# Patient Record
Sex: MALE | Race: WHITE
[De-identification: names, ages, dates, MRNs, and addresses within clinical notes are randomized per-mention and may not be internally consistent; named-entity substitution may affect disease eponyms.]

---

## 2018-02-23 ENCOUNTER — HOSPITAL ENCOUNTER (OUTPATIENT)
Dept: HOSPITAL 80 - FED | Age: 78
Setting detail: OBSERVATION
Discharge: HOME | End: 2018-02-23
Attending: INTERNAL MEDICINE | Admitting: INTERNAL MEDICINE
Payer: COMMERCIAL

## 2018-02-23 VITALS — RESPIRATION RATE: 18 BRPM | TEMPERATURE: 97.5 F | HEART RATE: 75 BPM | OXYGEN SATURATION: 94 %

## 2018-02-23 VITALS — DIASTOLIC BLOOD PRESSURE: 87 MMHG | SYSTOLIC BLOOD PRESSURE: 135 MMHG

## 2018-02-23 DIAGNOSIS — I25.2: ICD-10-CM

## 2018-02-23 DIAGNOSIS — Z95.5: ICD-10-CM

## 2018-02-23 DIAGNOSIS — I77.810: ICD-10-CM

## 2018-02-23 DIAGNOSIS — K21.9: ICD-10-CM

## 2018-02-23 DIAGNOSIS — I25.10: ICD-10-CM

## 2018-02-23 DIAGNOSIS — Z87.891: ICD-10-CM

## 2018-02-23 DIAGNOSIS — R07.9: Primary | ICD-10-CM

## 2018-02-23 LAB — PLATELET # BLD: 138 10^3/UL (ref 150–400)

## 2018-02-23 PROCEDURE — 99284 EMERGENCY DEPT VISIT MOD MDM: CPT

## 2018-02-23 PROCEDURE — 93017 CV STRESS TEST TRACING ONLY: CPT

## 2018-02-23 PROCEDURE — 78452 HT MUSCLE IMAGE SPECT MULT: CPT

## 2018-02-23 PROCEDURE — 71046 X-RAY EXAM CHEST 2 VIEWS: CPT

## 2018-02-23 PROCEDURE — A9500 TC99M SESTAMIBI: HCPCS

## 2018-02-23 PROCEDURE — 93005 ELECTROCARDIOGRAM TRACING: CPT

## 2018-02-23 PROCEDURE — G0378 HOSPITAL OBSERVATION PER HR: HCPCS

## 2018-02-23 NOTE — PDCARST
CAR Stress Test Results


Type of Stress Test: nuclear TM stress


Indication: cp/ PMH CAD


Description of Procedure: After informed consent was obtained, pt was exercised 

according to Flavio Protocol. Monitoring was performed with standard stress test 

lead electrode placement. Vital signs were monitored according to protocol 

throughout the procedure.  STRESS EKG AND HEMODYNAMIC DATA.  Exercise time: 5 

min.  This is equivalent to:  6.1  METS.  Resting heart rate:   81 bpm.  

Resting blood pressure: 126/78 mmHg.  Resting O2 saturation:91 %.  Peak heart 

rate: 142 bpm. This is 100% of age predicted maximum heart rate response.  Peak 

blood pressure: 174/90  mmHg.  Exercise O2:  94%.  Arrhythmias: Rest- 

occasional PVCs; exercise- occasional PVCs; recovery- occasional PVCs and 

frequent PACs.  Reason for termination: The test was stopped due to maximal 

effort.  Symptoms: The patient experienced no typical symptoms of angina during 

stress or recovery.  STRESS TEST ANALYSIS.  Baseline ECG: SR with RBBB and 1 

PVC.  Stress ECG: SR, RBBB, 1 mm upsloping STD.  exercise induced ischemic ECG 

changes: indeterminate due to RBBB.  Rhythm: Occasional PVCs and frequent PACs 

noted during exercise and recovery.  Blood pressure: Normal blood pressure 

response to exercise.  Exercise tolerance: The patient has normal exercise 

tolerance adjusted for age and gender.  Symptoms: No exercise induced symptoms.


Impression: IMPRESSIONS:  Stress ECG is indeterminate for ischemia given RBBB 

precluding accurate interpretation of V1 and V2.  Adequate exercise tolerance, 

no cp during exertion, and normal BP and oximetry.


Conclusion: Await nuclear images.

## 2018-02-23 NOTE — ASMTCASEMG
Living Arrangements

 

What is your living           Answers:  Alone                                 

arrangement? Who do you                                                       

live with?                                                                    

Type Of Residence

 

What kind of residence do     Answers:  House                                 

you live in?                                                                  

Discharge Plan Comments

 

Coordination Status           

Comments                      

Notes:

Pt is a 79 y/o man admitted for chest pain. Pt will have a stress test at some point. Pt will most 

likely d/c independent when medically stable. No therapies ordered at this time. CM available for 

changes.







Plan: Independent

 

Date Signed:  02/23/2018 10:24 AM

Electronically Signed By:WILEY Sheets

## 2018-02-23 NOTE — EDPHY
H & P


Stated Complaint: cp


Time Seen by Provider: 02/23/18 01:04


HPI/ROS: 





HPI


The patient presents with chest pain which began at 5:00 p.m. Tonight while he 

was at rest.  He describes it as a stinging left-sided chest pain that was 

intermittent.  It feels like his prior MI that he had in July though is more 

subtle.  The pain is not associated with any shortness of breath, nausea, 

vomiting, dizziness, diaphoresis.  He took 2 doses of aspirin and called an 

advice line and was instructed to come into the emergency department.  He has a 

history of an MI in July of 2017. He does walk daily about 45 min and does not 

experience chest pain .





REVIEW OF SYSTEMS


Constitutional:  No fever, no chills.


Eyes:  No discharge.


ENT:  No sore throat.


Cardiovascular:  Positive for chest pain, no palpitations.


Respiratory:  No cough, no shortness of breath.


Gastrointestinal:  No abdominal pain, no vomiting.


Genitourinary:  No hematuria.


Musculoskeletal:  No back pain.


Skin:  No rashes.


Neurological:  No headache.





PMHx:  Known CAD





Soc Hx:  Housed








PHYSICAL


General Appearance: Alert, no distress


Eyes: Pupils equal and round no pallor or injection


ENT, Mouth: Mucous membranes moist


Respiratory: There are no retractions, lungs are clear to auscultation


Cardiovascular:  Regular rate and rhythm 


Gastrointestinal:  Abdomen is soft and non-tender, no masses, bowel sounds 

normal 


Neurological:  A&O, moves all extremities


Skin:  Warm and dry, no rashes


Musculoskeletal: Neck is supple non tender 


Extremities:  symmetrical, full range of motion 


Psychiatric:  Patient is oriented X 3, there is no agitation 





Source: Patient


Exam Limitations: No limitations





- Personal History


Current Tetanus Diphtheria and Acellular Pertussis (TDAP): Yes





- Medical/Surgical History


Hx Asthma: No


Hx Chronic Respiratory Disease: No


Hx Diabetes: No


Hx Cardiac Disease: No


Hx Renal Disease: No


Hx Cirrhosis: No


Hx Alcoholism: No


Hx HIV/AIDS: No


Hx Splenectomy or Spleen Trauma: No


Other PMH: appy ,mi, cardiac stents.  tonsils.  amp thumb as child





- Social History


Smoking Status: Former smoker


Constitutional: 


 Initial Vital Signs











Temperature (C)  36.7 C   02/23/18 00:30


 


Heart Rate  72   02/23/18 00:30


 


Respiratory Rate  16   02/23/18 00:30


 


Blood Pressure  135/82 H  02/23/18 00:30


 


O2 Sat (%)  94   02/23/18 00:30








 











O2 Delivery Mode               Room Air














Allergies/Adverse Reactions: 


 





iodine Allergy (Verified 04/26/16 01:17)


 








Home Medications: 














 Medication  Instructions  Recorded


 


Acyclovir [Zovirax 400 mg (*)] 400 mg PO TID 02/23/18


 


Bisoprolol Fumarate [Zebeta (*)] 2.5 mg PO DAILY 02/23/18


 


Efinaconazole [Jublia] 4 ml TP 02/23/18


 


Finasteride [Proscar 5 MG (*)] 5 mg PO DAILY 02/23/18


 


Simvastatin [Zocor] 40 mg PO 02/23/18


 


Ticagrelor [Brilinta] 90 mg PO BID 02/23/18














Medical Decision Making





- Diagnostics


EKG Interpretation: 





EKG:  Complete interpretation has been separately recorded in the [facility] 

archive.  Summary impression:  Right bundle-branch block with left posterior 

fascicular block





Imaging Results: 





Chest x-ray two view shows no cardiomegaly, no infiltrate, interpreted by me, 

radiology interpretation is pending.


Differential Diagnosis: 





This is a 78-year-old male with known CAD status post stents placed for acute 

MI in July of 2017 who presents with chest pain lasting for several hours, 

occurring at rest, now resolved.





Differential diagnosis includes ACS, costochondritis, GERD.





In the emergency department, EKG, chest x-ray, basic labs were obtained.  These 

were all normal.  Patient does have right bundle-branch block.  I do not have 

an old EKG for comparison.  Given his history of CAD with pain which feels 

similar to his prior MI, I feel he should be admitted to the hospitalist.  I 

have discussed the case with Dr. Brewster who will admit the patient.  The 

patient is in agreement with the plan.





- Data Points


Laboratory Results: 


 Laboratory Results





 02/23/18 00:43 





 02/23/18 00:43 





 











  02/23/18 02/23/18





  00:43 00:43


 


WBC    6.05 10^3/uL 10^3/uL





    (3.80-9.50) 


 


RBC    4.60 10^6/uL 10^6/uL





    (4.40-6.38) 


 


Hgb    14.3 g/dL g/dL





    (13.7-17.5) 


 


Hct    40.7 % %





    (40.0-51.0) 


 


MCV    88.5 fL fL





    (81.5-99.8) 


 


MCH    31.1 pg pg





    (27.9-34.1) 


 


MCHC    35.1 g/dL g/dL





    (32.4-36.7) 


 


RDW    14.4 % %





    (11.5-15.2) 


 


Plt Count    138 10^3/uL L 10^3/uL





    (150-400) 


 


MPV    9.0 fL fL





    (8.7-11.7) 


 


Neut % (Auto)    62.8 % %





    (39.3-74.2) 


 


Lymph % (Auto)    22.5 % %





    (15.0-45.0) 


 


Mono % (Auto)    7.4 % %





    (4.5-13.0) 


 


Eos % (Auto)    6.3 % %





    (0.6-7.6) 


 


Baso % (Auto)    0.7 % %





    (0.3-1.7) 


 


Nucleat RBC Rel Count    0.0 % %





    (0.0-0.2) 


 


Absolute Neuts (auto)    3.80 10^3/uL 10^3/uL





    (1.70-6.50) 


 


Absolute Lymphs (auto)    1.36 10^3/uL 10^3/uL





    (1.00-3.00) 


 


Absolute Monos (auto)    0.45 10^3/uL 10^3/uL





    (0.30-0.80) 


 


Absolute Eos (auto)    0.38 10^3/uL 10^3/uL





    (0.03-0.40) 


 


Absolute Basos (auto)    0.04 10^3/uL 10^3/uL





    (0.02-0.10) 


 


Absolute Nucleated RBC    0.00 10^3/uL 10^3/uL





    (0-0.01) 


 


Immature Gran %    0.3 % %





    (0.0-1.1) 


 


Immature Gran #    0.02 10^3/uL 10^3/uL





    (0.00-0.10) 


 


Sodium  138 mEq/L mEq/L  





   (135-145)  


 


Potassium  4.2 mEq/L mEq/L  





   (3.5-5.2)  


 


Chloride  101 mEq/L mEq/L  





   ()  


 


Carbon Dioxide  27 mEq/l mEq/l  





   (22-31)  


 


Anion Gap  10 mEq/L mEq/L  





   (8-16)  


 


BUN  18 mg/dL mg/dL  





   (7-23)  


 


Creatinine  1.0 mg/dL mg/dL  





   (0.7-1.3)  


 


Estimated GFR  > 60   





   


 


Glucose  100 mg/dL mg/dL  





   ()  


 


Calcium  9.3 mg/dL mg/dL  





   (8.5-10.4)  


 


Troponin I  < 0.012 ng/mL ng/mL  





   (0.000-0.034)  














Departure





- Departure


Disposition: Home, Routine, Self-Care


Clinical Impression: 


Chest pain


Qualifiers:


 Chest pain type: unspecified Qualified Code(s): R07.9 - Chest pain, unspecified





Condition: Good

## 2018-02-23 NOTE — CPEKG
Heart Rate: 73

RR Interval: 822

P-R Interval: 196

QRSD Interval: 136

QT Interval: 436

QTC Interval: 481

P Axis: 49

QRS Axis: 102

T Wave Axis: 8

EKG Severity - ABNORMAL ECG -

EKG Impression: SINUS RHYTHM

EKG Impression: RBBB AND LPFB

Electronically Signed By: Cliff Cha 23-Feb-2018 09:31:12

## 2018-02-23 NOTE — PDGENHP
History and Physical





- Chief Complaint


Chest pain





- History of Present Illness


79 yo M w/ hx of CAD presents after chest pain. Patient became upset today and 

then began to notice sharp, stinging left sided chest pain. This persisted for 

about 7 hours without significant change. He did walk up some stairs during 

this episode of pain and it did not seem to affect the pain. He denies any 

associated symptoms. He called the [facility] RN line and was advised to come to the 

ED for evaluation. He is currently comfortable and chest pain free.





History Information





- Allergies/Home Medication List


Allergies/Adverse Reactions: 








iodine Allergy (Verified 04/26/16 01:17)


 





Home Medications: 








Aspirin  02/23/18 [Last Taken Unknown]


Bisoprolol Fumarate  02/23/18 [Last Taken Unknown]


Brilinta  02/23/18 [Last Taken Unknown]


Esomeprazole Sodium  02/23/18 [Last Taken Unknown]


SIMVASTATIN  02/23/18 [Last Taken Unknown]


Tamsulosin HCl  02/23/18 [Last Taken Unknown]





I have personally reviewed and updated: family history, medical history





- Past Medical History


coronary artery disease





- Family History


Positive for: male first degree with history of premature CAD





- Social History


Smoking Status: Former smoker





Review of Systems


Review of Systems: 





ROS: 10pt was reviewed & negative except for what was stated in HPI & below





Physical Exam


Physical Exam: 

















Temp Pulse Resp BP Pulse Ox


 


 36.7 C   72   16   135/82 H  94 


 


 02/23/18 00:30  02/23/18 00:30  02/23/18 00:30  02/23/18 00:30  02/23/18 00:30











Constitutional: no apparent distress, not in pain


Eyes: PERRL, EOMI


Ears, Nose, Mouth, Throat: moist mucous membranes, no oral mucosal ulcers


Cardiovascular: regular rate and rhythym, systolic murmur


Respiratory: no respiratory distress, no rales or rhonchi


Skin: warm, normal color


Musculoskeletal: full muscle strength, no muscle tenderness


Neurologic: AAOx3, CN II-XII Intact


Psychiatric: interacting appropriately, not anxious





Lab Data & Imaging Review





 02/23/18 00:43





 02/23/18 00:43














WBC  6.05 10^3/uL (3.80-9.50)   02/23/18  00:43    


 


RBC  4.60 10^6/uL (4.40-6.38)   02/23/18  00:43    


 


Hgb  14.3 g/dL (13.7-17.5)   02/23/18  00:43    


 


Hct  40.7 % (40.0-51.0)   02/23/18  00:43    


 


MCV  88.5 fL (81.5-99.8)   02/23/18  00:43    


 


MCH  31.1 pg (27.9-34.1)   02/23/18  00:43    


 


MCHC  35.1 g/dL (32.4-36.7)   02/23/18  00:43    


 


RDW  14.4 % (11.5-15.2)   02/23/18  00:43    


 


Plt Count  138 10^3/uL (150-400)  L  02/23/18  00:43    


 


MPV  9.0 fL (8.7-11.7)   02/23/18  00:43    


 


Neut % (Auto)  62.8 % (39.3-74.2)   02/23/18  00:43    


 


Lymph % (Auto)  22.5 % (15.0-45.0)   02/23/18  00:43    


 


Mono % (Auto)  7.4 % (4.5-13.0)   02/23/18  00:43    


 


Eos % (Auto)  6.3 % (0.6-7.6)   02/23/18  00:43    


 


Baso % (Auto)  0.7 % (0.3-1.7)   02/23/18  00:43    


 


Nucleat RBC Rel Count  0.0 % (0.0-0.2)   02/23/18  00:43    


 


Absolute Neuts (auto)  3.80 10^3/uL (1.70-6.50)   02/23/18  00:43    


 


Absolute Lymphs (auto)  1.36 10^3/uL (1.00-3.00)   02/23/18  00:43    


 


Absolute Monos (auto)  0.45 10^3/uL (0.30-0.80)   02/23/18  00:43    


 


Absolute Eos (auto)  0.38 10^3/uL (0.03-0.40)   02/23/18  00:43    


 


Absolute Basos (auto)  0.04 10^3/uL (0.02-0.10)   02/23/18  00:43    


 


Absolute Nucleated RBC  0.00 10^3/uL (0-0.01)   02/23/18  00:43    


 


Immature Gran %  0.3 % (0.0-1.1)   02/23/18  00:43    


 


Immature Gran #  0.02 10^3/uL (0.00-0.10)   02/23/18  00:43    


 


Sodium  138 mEq/L (135-145)   02/23/18  00:43    


 


Potassium  4.2 mEq/L (3.5-5.2)   02/23/18  00:43    


 


Chloride  101 mEq/L ()   02/23/18  00:43    


 


Carbon Dioxide  27 mEq/l (22-31)   02/23/18  00:43    


 


Anion Gap  10 mEq/L (8-16)   02/23/18  00:43    


 


BUN  18 mg/dL (7-23)   02/23/18  00:43    


 


Creatinine  1.0 mg/dL (0.7-1.3)   02/23/18  00:43    


 


Estimated GFR  > 60   02/23/18  00:43    


 


Glucose  100 mg/dL ()   02/23/18  00:43    


 


Calcium  9.3 mg/dL (8.5-10.4)   02/23/18  00:43    


 


Troponin I  < 0.012 ng/mL (0.000-0.034)   02/23/18  00:43    








Visualized and Interpreted EKG results: Yes


EKG Interpretation: Positive for: normal sinsus rhythm, right bundle branch 

block





Assessment & Plan


Assessment: 








79 yo M w/ hx of CAD presents with chest pain.








Plan: 


1. Chest pain - Present for about 7 hours after episode of emotional stress. 

Not classic for anginal chest pain noting no variation with activity but does 

have history of stent placement last July. HEART score of 4 so will be admitted 

for inpatient risk stratification.


- Admit to PCU for observation


- Monitor on telemetry, trend cardiac enzymes


- Nuclear stress test ordered


2. Hx of CAD - Had 2 stents placed in July of 2017 while in [place]. He has 

been compliant with BB, ASA, statin, and ticagrelor.


- Continue home meds


- Acute management as above


3. GERD - On PPI





Diet - NPO pending stress test


Ppx - LMWH


Code - Full


Dispo - Admit to PCU under observation status

## 2018-02-24 NOTE — GDS
[f rep st]



                                                             DISCHARGE SUMMARY





DISCHARGE DIAGNOSES:  

1.  Chest pain with negative stress testing.

2.  Coronary artery disease, status post recent stenting.



HISTORY:  The patient is a 78-year-old male, who developed atypical left-sided chest pain.  He had 2 
stents placed while in [place] in July 2017.  He is on a good medical regimen including aspirin, Bril
inta, statin, and a beta blocker.  He was admitted to observation with serial troponins and EKGs that
 were unremarkable.  Stress test today showed an ejection fraction of 45% and a fixed inferolateral w
all defect consistent with previous infarct and some subtle inferior wall hypokinesis.  It is suspect
ed that this is due to an infarct he sustained in [place].  There was no reversible defect.  He follo
ws with Dr. Wagner at [facility] and will follow up with them regarding further m
anagement, but acute cardiac catheterization was not felt to be needed at this time.



DISCHARGE MEDICATIONS:  Please see computerized record for full detailed list.  There were no new med
ications given at time of hospital discharge.



ADDITIONAL DISCHARGE INSTRUCTIONS:  Follow up with Dr. Villalta and Dr. Wagner at [facility]
 for further care.  



Greater than 30 minutes' time was spent arranging this discharge.  Patient was seen and examined by m
e at the time of discharge.





Job #:  [id number]

## 2019-04-18 ENCOUNTER — HOSPITAL ENCOUNTER (EMERGENCY)
Dept: HOSPITAL 80 - FED | Age: 79
LOS: 1 days | Discharge: HOME | End: 2019-04-19
Payer: COMMERCIAL

## 2019-04-18 DIAGNOSIS — E86.9: ICD-10-CM

## 2019-04-18 DIAGNOSIS — T78.40XA: Primary | ICD-10-CM

## 2019-04-18 DIAGNOSIS — R07.89: ICD-10-CM

## 2019-04-18 DIAGNOSIS — Z87.891: ICD-10-CM

## 2019-04-18 PROCEDURE — 93005 ELECTROCARDIOGRAM TRACING: CPT

## 2019-04-18 PROCEDURE — 99284 EMERGENCY DEPT VISIT MOD MDM: CPT

## 2019-04-18 PROCEDURE — 96365 THER/PROPH/DIAG IV INF INIT: CPT

## 2019-04-18 PROCEDURE — 96375 TX/PRO/DX INJ NEW DRUG ADDON: CPT

## 2019-04-18 PROCEDURE — 96361 HYDRATE IV INFUSION ADD-ON: CPT

## 2019-04-18 NOTE — EDPHY
General





- History


Smoking Status: Former smoker


Time Seen by Provider: 04/18/19 23:01


Narrative: 








CLINICAL IMPRESSION: 





Allergic reaction, chest tightness


 


ASSESSMENT/PLAN: 





Patient is an 79-year-old male with a significant history of coronary artery 

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 

with possible allergic reaction.  He is well-appearing and in no acute 

distress.  His lungs were clear to auscultation bilaterally, no evidence of 

hypoxia or respiratory distress.  The patient did have complaints of chest 

tightness and mild shortness of breath, an ECG was obtained which revealed a 

right bundle branch block with frequent PVCs.  Troponin was 0.04, negative.  No 

findings to suggest ACS; I suspect his brief chest tightness and shortness of 

breath was secondary to his allergic reaction.  There was no evidence of 

angioedema, stridor, respiratory distress or airway compromise.  Patient was 

given Benadryl, Pepcid and Solu-Medrol in the emergency department with 

improvement of his symptoms.  I suspect patient was exposed to shellfish at the 

party he was at.  There were no findings to suggest anaphylaxis, SJS/TENS, 

medication reaction or infectious process.  On repeat examination at 0011 he is 

well-appearing, denies any complaints.  Patient will continued to be monitored 

in the emergency department, Dr. Clements will resume care of this patient at 

this time.





DIFFERENTIAL DX: 





Differential diagnosis including but not limited to anaphylaxis, urticaria, TENS

/SJS, medication reaction, infectious process


_____________ 


ED COURSE: 





2316: Case discussed with Dr. Clements.  ECG revealed right bundle branch block 

with frequent PVCs consistent with trigeminy.  When reviewing his old ECG, 

underlying right bundle branch block is present, overall very similar appearing.





2335:  P OC troponin is negative at 0.04.





0011:  On repeat examination the patient is resting comfortably.  His facial 

flushing has resolved, his conjunctiva are now normal appearing.  He denies any 

physical complaints.  His heart rate is 70, oxygen saturation is 96% on room 

air.





0024:  All aspects of this patient's care was discussed with Dr. Clements, he 

will resume care of this patient at this time.


_________________ 


CHIEF COMPLAINT:  Possible allergic reaction


_________________ 


HPI: 





Patient is an 79-year-old male with a significant history of coronary artery 

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 

with possible allergic reaction.  Patient reports he was at a party earlier 

this evening, he was not paying attention to what he ate, during the party he 

started to experience intense itching of his hands and uncontrollable sneezing.

  He subsequently started to develop some tightness in his chest with 

associated shortness of breath.  He states this feels exactly like similar 

episodes he has had in the past when exposed to shellfish.  Symptoms started 

approximately 2.5 hr prior to arrival, he took a Claritin and feels that his 

symptoms are mildly improving.  He denies any oral swelling, facial swelling, 

difficulty swallowing or change in voice.  He also denies any nausea, vomiting, 

chest pain, persistent chest tightness, persistent shortness of breath or 

abdominal pain.  He has not noticed any rash.  Patient does have a history of 

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


_________________ 


PMH:  Seasonal allergies 


Pertinent Past Surgical History:  Denies 


Family History:  Not contributory 


Social History:  Denies 


_________________ 


REVIEW OF SYSTEMS: 


All other systems negative 


Constitutional: No fever. 


Eyes: No discharge, vision change 


ENT: No sore throat, congestion, ear pain. 


Cardiovascular:  Chest tightness, denies palpitations.  


Respiratory:  Shortness of breath, denies cough.


Gastrointestinal: No abdominal pain, no vomiting. 


Genitourinary: No hematuria, dysuria, flank pain, pelvic pain 


Musculoskeletal: No back pain, no myalgias. 


Skin:  Itching.  Denies rash.


Neurological: No headache, dizziness, weakness. 


_________________ 


PHYSICAL EXAM: 


General Appearance: Alert, well-appearing, no acute distress. 


HENT: 


Normocephalic, atraumatic.


Face is mildly flushed, faint macular rash on his forehead.


Bilateral external ears are normal.  Bilateral tympanic membranes are normal 

with pearly gray reflex.


Nares are clear, mucosa is pink.


Oropharynx is clear, uvula is midline.  There is no tonsillar enlargement or 

exudate.  There is no evidence of angioedema.


Phonation is normal without hot potato voice, there is no stridor. 


Eyes: PERRLA, EOMI. Conjunctiva injected. 


Neck: Supple, nontender. 


Respiratory: There are no retractions, lungs are clear to auscultation.  There 

is no wheeze or rhonchi. 


Cardiac: Irregular rhythm, no murmurs or gallops. 


Gastrointestinal: Abdomen is soft, nontender, bowel sounds normal, no masses/

hernia, no rigidity, guarding or focal peritoneal findings. 


Neurological:  Alert and oriented x 3, CN 2-12 grossly intact. 


Skin: 


Warm and dry.  See above, no other rash appreciated.


Musculoskeletal: Extremities are symmetrical, full range of motion, no 

tenderness, deformity, swelling, or erythema. 


Psychiatric: Patient is oriented X 3, there is no agitation. 


_________________ 


MEDICAL DECISION MAKING: 


Patient was seen independently. Secondary supervising physician at time of 

evaluation was Dr. Clements.


Summary: See Assessment and Plan.


Clinical lab tests:  Not applicable. 


Independent visualization of images, tracing, or specimens:  Not applicable. 


Decision to obtain medical records or history from someone other than the 

patient:  No 


Review / Summarize previous medical records:  Yes 


Discussed patient with another provider:  Yes, Dr. Clements


Patient Progress:  Stable, dispo pending at this time. 


 (Yee Vences)


Medical Decision Making: 





EKG interpretation by me on record in [employer] system.  Impression time of 

EKG 2:40 a.m. Sinus rhythm rate of 84, ventricular trigeminy, stable from the 

patient's previous EKGs no acute ischemia on this EKG.  Patient does not have 

chest pain.  This was repeated due to his cardiac history, and additionally his 

allergic reaction presentation.





Patient re-evaluated 2:56 a.m. Patient continues to do well there has been no 

progression of allergic reaction is been monitored here for over 4 hr and has 

done very well.  Denies any chest pain or shortness of breath.  No progression 

of allergic reaction he would like to go home.





We discussed return precautions he understands return emergency room if 

develops worsening symptoms this includes chest pain, shortness of breath, fever

, vomiting, not doing well





Patient had a repeat troponin 0.04 same as his previous troponin there has been 

no increased.  EKG has been stable.








Patient eager for discharge he explains to me that he had allergic reaction 

tonight with Hand itching, runny nose, some shortness of breath.  No chest pain 

no chest pressure.  He feels much better now.  He would like to go home.  I do 

feel that it is reasonable to discharge him.  He has had no progression of 

allergic reaction we discussed return precautions.  He is comfortable this plan


 (Julius Clements)





- Objective


Vital Signs: 


 Initial Vital Signs











Temperature (C)  36.4 C   04/18/19 22:56


 


Heart Rate  84   04/18/19 22:56


 


Respiratory Rate  20   04/18/19 22:56


 


Blood Pressure  116/92 H  04/18/19 22:56


 


O2 Sat (%)  93   04/18/19 22:56








 











O2 Delivery Mode               Room Air














Allergies/Adverse Reactions: 


 





iodine Allergy (Verified 04/18/19 22:56)


 Rash


shellfish derived Allergy (Verified 04/18/19 22:56)


 Unknown








Home Medications: 














 Medication  Instructions  Recorded


 


Aspirin [Aspirin 81mg (*)] 81 mg PO DAILY 02/23/18


 


Bisoprolol Fumarate [Zebeta (*)] 2.5 mg PO DAILY 02/23/18


 


Finasteride [Proscar 5 MG (*)] 5 mg PO DAILY 02/23/18


 


Magnesium Amino Acid Chelate 250 mg PO HS 02/23/18





[Magnesium]  


 


Omeprazole 20 mg PO DAILY 02/23/18


 


Simvastatin [Zocor] 40 mg PO HS 02/23/18


 


EPINEPHrine [Epipen 0.3 MG] 0.3 mg IM ONCE #2 syr 04/19/19


 


Famotidine [Pepcid 20 MG (*)] 20 mg PO BID #6 tab 04/19/19


 


diphenhydrAMINE [Benadryl 25 MG 25 mg PO BID #6 tab 04/19/19





(*)]  


 


methylPREDNISolone [Medrol Dose 1 each PO AD #1 ea 04/19/19





Mike]  











Laboratory Results: 


 











  04/18/19





  23:20


 


POC Troponin I  0.04 ng/mL ng/mL





   (0.00-0.08) 











Medications Given: 


 








Discontinued Medications





Diphenhydramine HCl (Benadryl Injection)  50 mg IVP EDNOW ONE


   Stop: 04/18/19 23:07


   Last Admin: 04/18/19 23:14 Dose:  50 mg


Sodium Chloride (Ns)  1,000 mls @ 0 mls/hr IV ONCE ONE; Wide Open


   PRN Reason: Protocol


   Stop: 04/18/19 23:07


   Last Admin: 04/18/19 23:20 Dose:  1,000 mls


Famotidine/Sodium Chloride (Pepcid 20 Mg (Premix))  50 mls @ 200 mls/hr IV 

EDNOW ONE


   Stop: 04/18/19 23:20


   Last Admin: 04/18/19 23:15 Dose:  50 mls


Methylprednisolone Sodium Succinate (Solu-Medrol)  125 mg IVP EDNOW ONE


   Stop: 04/18/19 23:07


   Last Admin: 04/18/19 23:14 Dose:  125 mg





Point of Care Test Results: 


 Chemistry











  04/18/19





  23:20


 


POC Troponin I  0.04 ng/mL ng/mL





   (0.00-0.08) 














Departure





- Departure


Disposition: Home, Routine, Self-Care


Clinical Impression: 


Allergic reaction


Qualifiers:


 Encounter type: initial encounter Qualified Code(s): T78.40XA - Allergy, 

unspecified, initial encounter





Condition: Good


Instructions:  Allergies (ED)


Additional Instructions: 


DISCHARGE INSTRUCTIONS FROM YOUR PROVIDER


Thank you for visiting our emergency department today. Please keep in mind that 

discharge from the emergency department does not mean that there is nothing 

wrong - it simply means that we have not identified an emergency condition that 

requires further evaluation or treatment in the hospital. You should always 

plan to follow up with primary care for re-evaluation of your condition in the 

next 1-2 days. 





Avoid any known allergens and exposures.





Take Zyrtec 10 mg nightly. This is over the counter.





Pepcid 40 mg daily. This is over the counter.





Benadryl 25 mg every 4-6 hours as needed for breakthrough itching, hives and/or 

swelling.  This may make you sleepy, please do not drive or operate machinery.  

I would also recommend that you take Zyrtec daily, please follow instructions.





Medrol Dosepack (STEROID) as prescribed. 


More than 50% of people will notice resolution of allergy symptoms after 

starting this medication. BUT, over 50% of people will notice recurrence of 

allergy symptoms called rebound when the medication is stopped. 


If a steroid is taken close to allergy testing, the results will be affected.





Schedule a follow-up visit with your primary care provider for re-evaluation 

next week.





Watch closely for any signs of throat tightness and/or difficulty breathing, 

worsening rash , oral swelling , difficulty swallowing , facial swelling, 

faintness or for any other concerning symptom.  These symptoms can suggest a 

life threatening allergic reaction and require immediate attention of a medical 

professional. 


 


People present with illnesses and injuries in different ways, and it is always 

possible that we have missed something. 





Again, thank you for choosing our emergency department. We hope that you feel 

better.


Referrals: 


Deon Koenig MD [Medical Doctor] - As per Instructions


Prescriptions: 


diphenhydrAMINE [Benadryl 25 MG (*)] 25 mg PO BID #6 tab


EPINEPHrine [Epipen 0.3 MG] 0.3 mg IM ONCE #2 syr


Famotidine [Pepcid 20 MG (*)] 20 mg PO BID #6 tab


methylPREDNISolone [Medrol Dose Mike] 1 each PO AD #1 ea

## 2019-04-19 VITALS — DIASTOLIC BLOOD PRESSURE: 48 MMHG | SYSTOLIC BLOOD PRESSURE: 102 MMHG

## 2019-04-19 NOTE — CPEKG
Test Reason : OPEN

Blood Pressure : ***/*** mmHG

Vent. Rate : 084 BPM     Atrial Rate : 085 BPM

   P-R Int : 182 ms          QRS Dur : 141 ms

    QT Int : 408 ms       P-R-T Axes : 045 110 002 degrees

   QTc Int : 483 ms

 

Sinus rhythm

Ventricular trigeminy

RBBB and LPFB

Inferior infarct, old

 

Confirmed by Julius Clements ([id number]) on 4/19/2019 7:45:54 AM

 

Referred By: Julius Clements           Confirmed By:Julius Clements

## 2019-04-19 NOTE — CPEKG
Test Reason : OPEN

Blood Pressure : ***/*** mmHG

Vent. Rate : 089 BPM     Atrial Rate : 088 BPM

   P-R Int : 176 ms          QRS Dur : 143 ms

    QT Int : 408 ms       P-R-T Axes : 041 245 010 degrees

   QTc Int : 497 ms

 

Sinus rhythm

Ventricular trigeminy

Right bundle branch block

 

Confirmed by Julius Clements ([id number]) on 4/19/2019 7:45:55 AM

 

Referred By: Julius Clements           Confirmed By:Julius Clements